# Patient Record
Sex: MALE | Race: WHITE | NOT HISPANIC OR LATINO | Employment: OTHER | URBAN - METROPOLITAN AREA
[De-identification: names, ages, dates, MRNs, and addresses within clinical notes are randomized per-mention and may not be internally consistent; named-entity substitution may affect disease eponyms.]

---

## 2019-07-15 ENCOUNTER — OFFICE VISIT (OUTPATIENT)
Dept: INTERNAL MEDICINE | Facility: CLINIC | Age: 79
End: 2019-07-15
Payer: MEDICARE

## 2019-07-15 VITALS
WEIGHT: 180 LBS | DIASTOLIC BLOOD PRESSURE: 62 MMHG | SYSTOLIC BLOOD PRESSURE: 98 MMHG | HEART RATE: 56 BPM | OXYGEN SATURATION: 96 % | TEMPERATURE: 98 F | BODY MASS INDEX: 26.66 KG/M2 | RESPIRATION RATE: 16 BRPM | HEIGHT: 69 IN

## 2019-07-15 DIAGNOSIS — Z00.00 MEDICARE ANNUAL WELLNESS VISIT, INITIAL: Primary | ICD-10-CM

## 2019-07-15 DIAGNOSIS — I50.9 CONGESTIVE HEART FAILURE, UNSPECIFIED HF CHRONICITY, UNSPECIFIED HEART FAILURE TYPE: ICD-10-CM

## 2019-07-15 DIAGNOSIS — R41.89 COGNITIVE CHANGE: ICD-10-CM

## 2019-07-15 DIAGNOSIS — I25.10 ATHEROSCLEROSIS OF CORONARY ARTERY, ANGINA PRESENCE UNSPECIFIED, UNSPECIFIED VESSEL OR LESION TYPE, UNSPECIFIED WHETHER NATIVE OR TRANSPLANTED HEART: ICD-10-CM

## 2019-07-15 PROCEDURE — G0438 PR WELCOME MEDICARE ANNUAL WELLNESS INITIAL VISIT: ICD-10-PCS | Mod: S$GLB,,, | Performed by: INTERNAL MEDICINE

## 2019-07-15 PROCEDURE — G0438 PPPS, INITIAL VISIT: HCPCS | Mod: S$GLB,,, | Performed by: INTERNAL MEDICINE

## 2019-07-15 RX ORDER — APIXABAN 5 MG/1
5 TABLET, FILM COATED ORAL 2 TIMES DAILY
Refills: 11 | COMMUNITY
Start: 2019-06-18

## 2019-07-15 RX ORDER — DONEPEZIL HYDROCHLORIDE 5 MG/1
5 TABLET, FILM COATED ORAL NIGHTLY
Qty: 30 TABLET | Refills: 11 | Status: SHIPPED | OUTPATIENT
Start: 2019-07-15 | End: 2019-10-14

## 2019-07-15 RX ORDER — NITROGLYCERIN 0.4 MG/1
TABLET SUBLINGUAL
COMMUNITY
End: 2021-11-08 | Stop reason: SDUPTHER

## 2019-07-15 RX ORDER — ATORVASTATIN CALCIUM 40 MG/1
TABLET, FILM COATED ORAL
COMMUNITY

## 2019-07-15 RX ORDER — FUROSEMIDE 20 MG/1
20 TABLET ORAL DAILY
Refills: 4 | COMMUNITY
Start: 2019-06-04 | End: 2020-05-18

## 2019-07-15 RX ORDER — METOPROLOL TARTRATE 25 MG/1
25 TABLET ORAL DAILY
COMMUNITY

## 2019-07-15 NOTE — PROGRESS NOTES
Subjective:      Patient ID: Navi Almonte is a 78 y.o. male.    Chief Complaint: Follow-up (annual wellness)    HPI: with h/o CAD s/p stenting in 1970s. Patient Patient also has CHF with EF 30%. reports ankle swelling, no PND and orthopnea.  Patient is able to walk 1/2 a block only without getting short of breath. Patient has some runny nose, nasal congestion, some post nasal drip, no cough or wheezing. Patient has h/o Asbestos exposure.    Patient denies depression, no crying spells, has lack of energy/interest, has no feeling of guilt/worthlessness.     Patient denies any more Nightmare but reports random limb movement.    Review of Systems   Constitutional: Negative for chills, diaphoresis, fever, malaise/fatigue and weight loss.   HENT: Negative for congestion, ear pain, sinus pain, sore throat and tinnitus.    Eyes: Negative for blurred vision and photophobia.   Respiratory: Negative for cough, hemoptysis, shortness of breath and wheezing.    Cardiovascular: Negative for chest pain, palpitations, orthopnea, leg swelling and PND.   Gastrointestinal: Negative for abdominal pain, blood in stool, constipation, diarrhea, heartburn, melena, nausea and vomiting.   Genitourinary: Negative for dysuria, frequency and urgency.   Musculoskeletal: Negative for back pain, myalgias and neck pain.   Skin: Negative for rash.   Neurological: Negative for dizziness, tremors, seizures, loss of consciousness and weakness.   Endo/Heme/Allergies: Negative for polydipsia.   Psychiatric/Behavioral: Negative for depression and hallucinations. The patient does not have insomnia.      Objective:     Physical Exam   Constitutional: He is oriented to person, place, and time. No distress.   Neck: No thyromegaly present.   Cardiovascular: Normal rate, regular rhythm and normal heart sounds.   No murmur heard.  Pulmonary/Chest: Effort normal and breath sounds normal. No respiratory distress. He has no wheezes.   Abdominal: Soft. Bowel sounds  are normal. He exhibits no distension. There is no tenderness.   Musculoskeletal: He exhibits no edema.   Lymphadenopathy:     He has no cervical adenopathy.   Neurological: He is alert and oriented to person, place, and time.   Skin: He is not diaphoretic.   Psychiatric: He has a normal mood and affect. His behavior is normal. Judgment and thought content normal.     Assessment:     1. Medicare annual wellness visit, initial    2. Atherosclerosis of coronary artery, angina presence unspecified, unspecified vessel or lesion type, unspecified whether native or transplanted heart    3. Congestive heart failure, unspecified HF chronicity, unspecified heart failure type    4. Cognitive change       Plan:   Patient is overall in good health.   Patient has coronary artery disease and is doing well without any symptoms.   Congestive heart failure is also seem compensated.   The patient is not able to draw the clock and dry all the arms and showed time.   Patient is oriented to time place and person and denies poor memory.   This seems to be start of decline in cognitive function.  Will start on aricept.  Patient vaccination does not seem up to date:  But patient reports all vaccines are done at pharmacy.   Will call [facility] system pharmacy on [facility] and get records.

## 2019-07-19 DIAGNOSIS — R73.01 IMPAIRED FASTING GLUCOSE: Primary | ICD-10-CM

## 2019-07-19 LAB
ABS NRBC COUNT: 0 X 10 3/UL (ref 0–0.01)
ABSOLUTE BASOPHIL: 0.05 X 10 3/UL (ref 0–0.22)
ABSOLUTE EOSINOPHIL: 0.62 X 10 3/UL (ref 0.04–0.54)
ABSOLUTE IMMATURE GRAN: 0.01 X 10 3/UL (ref 0–0.04)
ABSOLUTE LYMPHOCYTE: 2.2 X 10 3/UL (ref 0.86–4.75)
ABSOLUTE MONOCYTE: 0.59 X 10 3/UL (ref 0.22–1.08)
ALBUMIN SERPL-MCNC: 4.4 G/DL (ref 3.5–5.2)
ALBUMIN/GLOB SERPL ELPH: 1.6 {RATIO} (ref 1–2.7)
ALP ISOS SERPL LEV INH-CCNC: 80 IU/L (ref 40–130)
ALT (SGPT): 15 U/L (ref 0–41)
ANION GAP SERPL CALC-SCNC: 10 MMOL/L (ref 8–17)
AST SERPL-CCNC: 18 U/L (ref 0–40)
BASOPHILS NFR BLD: 0.8 %
BILIRUBIN, TOTAL: 0.93 MG/DL (ref 0–1.2)
BNP SERPL-MCNC: 125 PG/ML (ref 0–100)
BUN/CREAT SERPL: 16 (ref 6–20)
CALCIUM SERPL-MCNC: 9.7 MG/DL (ref 8.6–10.2)
CARBON DIOXIDE, CO2: 33 MMOL/L (ref 22–29)
CHLORIDE: 104 MMOL/L (ref 98–107)
CHOLEST SERPL-MSCNC: 115 MG/DL (ref 100–200)
CREAT SERPL-MCNC: 0.99 MG/DL (ref 0.7–1.2)
EOSINOPHIL NFR BLD: 10 %
GFR ESTIMATION: 72.74
GLOBULIN: 2.7 G/DL (ref 1.5–4.5)
GLUCOSE: 128 MG/DL (ref 82–115)
HCT VFR BLD AUTO: 51.8 % (ref 42–52)
HDLC SERPL-MCNC: 45 MG/DL
HGB BLD-MCNC: 16.2 G/DL (ref 14–18)
IMMATURE GRANULOCYTES: 0.2 % (ref 0–0.5)
LDL/HDL RATIO: 1.1 (ref 1–3)
LDLC SERPL CALC-MCNC: 50.6 MG/DL (ref 0–100)
LYMPHOCYTES NFR BLD: 35.4 %
MCH RBC QN AUTO: 28.9 PG (ref 27–32)
MCHC RBC AUTO-ENTMCNC: 31.3 G/DL (ref 32–36)
MCV RBC AUTO: 92.3 FL (ref 80–94)
MONOCYTES NFR BLD: 9.5 %
NEUTROPHILS ABSOLUTE COUNT: 2.74 X 10 3/UL (ref 2.15–7.56)
NEUTROPHILS NFR BLD: 44.1 %
NUCLEATED RED BLOOD CELLS: 0 /100 WBC (ref 0–0.2)
PLATELET # BLD AUTO: 172 X 10 3/UL (ref 135–400)
POTASSIUM: 4.8 MMOL/L (ref 3.5–5.1)
PROT SNV-MCNC: 7.1 G/DL (ref 6.4–8.3)
RBC # BLD AUTO: 5.61 X 10 6/UL (ref 4.7–6.1)
RDW-SD: 45.4 FL (ref 37–54)
SODIUM: 147 MMOL/L (ref 136–145)
TRIGL SERPL-MCNC: 97 MG/DL (ref 0–150)
UREA NITROGEN (BUN): 15.8 MG/DL (ref 8–23)
WBC # BLD: 6.21 X 10 3/UL (ref 4.3–10.8)

## 2019-07-23 LAB
ESTIMATED AVERAGE GLUCOSE: 126 MG/DL
HBA1C MFR BLD: 6 % (ref 4–6)

## 2019-10-14 ENCOUNTER — OFFICE VISIT (OUTPATIENT)
Dept: INTERNAL MEDICINE | Facility: CLINIC | Age: 79
End: 2019-10-14
Payer: MEDICARE

## 2019-10-14 VITALS
HEART RATE: 65 BPM | WEIGHT: 185 LBS | DIASTOLIC BLOOD PRESSURE: 63 MMHG | BODY MASS INDEX: 27.4 KG/M2 | TEMPERATURE: 98 F | SYSTOLIC BLOOD PRESSURE: 103 MMHG | RESPIRATION RATE: 16 BRPM | HEIGHT: 69 IN

## 2019-10-14 DIAGNOSIS — Z23 NEED FOR TDAP VACCINATION: ICD-10-CM

## 2019-10-14 DIAGNOSIS — Z91.09 ENVIRONMENTAL ALLERGIES: ICD-10-CM

## 2019-10-14 DIAGNOSIS — I25.10 ATHEROSCLEROSIS OF CORONARY ARTERY, ANGINA PRESENCE UNSPECIFIED, UNSPECIFIED VESSEL OR LESION TYPE, UNSPECIFIED WHETHER NATIVE OR TRANSPLANTED HEART: ICD-10-CM

## 2019-10-14 DIAGNOSIS — R41.89 COGNITIVE CHANGE: ICD-10-CM

## 2019-10-14 DIAGNOSIS — Z23 NEED FOR SHINGLES VACCINE: ICD-10-CM

## 2019-10-14 DIAGNOSIS — I48.0 PAROXYSMAL ATRIAL FIBRILLATION: Primary | ICD-10-CM

## 2019-10-14 PROBLEM — I48.91 ATRIAL FIBRILLATION: Status: ACTIVE | Noted: 2019-10-14

## 2019-10-14 PROCEDURE — 99214 OFFICE O/P EST MOD 30 MIN: CPT | Mod: S$GLB,,, | Performed by: INTERNAL MEDICINE

## 2019-10-14 PROCEDURE — 99214 PR OFFICE/OUTPT VISIT, EST, LEVL IV, 30-39 MIN: ICD-10-PCS | Mod: S$GLB,,, | Performed by: INTERNAL MEDICINE

## 2019-10-14 RX ORDER — LOSARTAN POTASSIUM 25 MG/1
25 TABLET ORAL NIGHTLY
Refills: 6 | COMMUNITY
Start: 2019-09-23 | End: 2020-01-14

## 2019-10-14 NOTE — PROGRESS NOTES
Subjective:      Patient ID: Navi Almonte is a 79 y.o. male.    Chief Complaint: Follow-up    Patient with h/o CAD s/p stenting in 1970s. Patient Patient also has CHF with EF 30%. reports ankle swelling, no PND and orthopnea.  Patient is being followed by Dr. Samano.  Patient is able to walk 1/2 a block only without getting short of breath. Patient is on Eliquis but does not know why is he on blood thinner.  Call Cardiology office and got the note that suggest the patient has atrial fibrillation.     Patient has some runny nose, nasal congestion, some post nasal drip, no cough or wheezing. Patient has h/o Asbestos exposure.    Patient denies depression, no crying spells, has lack of energy/interest, has no feeling of guilt/worthlessness.     Patient on last visit was found to have progressively worsening cognitive function and was not able to draw a clock. Patient is started on Aricpet and is tolerating it well.     Review of Systems   Constitutional: Negative for chills, diaphoresis, fever, malaise/fatigue and weight loss.   HENT: Negative for congestion, ear pain, sinus pain, sore throat and tinnitus.    Eyes: Negative for blurred vision and photophobia.   Respiratory: Negative for cough, hemoptysis, shortness of breath and wheezing.    Cardiovascular: Negative for chest pain, palpitations, orthopnea, leg swelling and PND.   Gastrointestinal: Negative for abdominal pain, blood in stool, constipation, diarrhea, heartburn, melena, nausea and vomiting.   Genitourinary: Negative for dysuria, frequency and urgency.   Musculoskeletal: Negative for back pain, myalgias and neck pain.   Skin: Negative for rash.   Neurological: Negative for dizziness, tremors, seizures, loss of consciousness and weakness.   Endo/Heme/Allergies: Negative for polydipsia.   Psychiatric/Behavioral: Negative for depression and hallucinations. The patient does not have insomnia.      Objective:     Physical Exam   Constitutional: He is  oriented to person, place, and time. No distress.   Neck: No thyromegaly present.   Cardiovascular: Normal rate, regular rhythm and normal heart sounds.   No murmur heard.  Pulmonary/Chest: Effort normal and breath sounds normal. No respiratory distress. He has no wheezes.   Abdominal: Soft. Bowel sounds are normal. He exhibits no distension. There is no tenderness.   Musculoskeletal: He exhibits no edema.   Lymphadenopathy:     He has no cervical adenopathy.   Neurological: He is alert and oriented to person, place, and time.   Skin: He is not diaphoretic.   Psychiatric: He has a normal mood and affect. His behavior is normal. Judgment and thought content normal.     Assessment:     1. Need for Tdap vaccination    2. Paroxysmal atrial fibrillation    3. Need for shingles vaccine    4. Environmental allergies    5. Cognitive change       Plan:   Patient CHF symptoms are under good control.  Will continue same medication.  Advised patient to keep appointment with cardiologist.  Patient has atrial fibrillation and heart rate seems under good control.  Patient is using Eliquis for anticoagulation.  Will continue the medication.  Patient has allergies advised to take Flonase as needed.  Will order shingle and Tdap vaccine  Patient on last visit reported decrease memory and problem with cognitive function.   Today mini-mental exam showed good memory.   Patient is not taking Aricept will not continue the medicine.

## 2020-01-14 ENCOUNTER — OFFICE VISIT (OUTPATIENT)
Dept: INTERNAL MEDICINE | Facility: CLINIC | Age: 80
End: 2020-01-14
Payer: MEDICARE

## 2020-01-14 VITALS
TEMPERATURE: 98 F | HEIGHT: 69 IN | SYSTOLIC BLOOD PRESSURE: 102 MMHG | DIASTOLIC BLOOD PRESSURE: 59 MMHG | WEIGHT: 187 LBS | HEART RATE: 63 BPM | OXYGEN SATURATION: 96 % | BODY MASS INDEX: 27.7 KG/M2

## 2020-01-14 DIAGNOSIS — I48.0 PAROXYSMAL ATRIAL FIBRILLATION: ICD-10-CM

## 2020-01-14 DIAGNOSIS — I50.9 CONGESTIVE HEART FAILURE, UNSPECIFIED HF CHRONICITY, UNSPECIFIED HEART FAILURE TYPE: Primary | ICD-10-CM

## 2020-01-14 PROCEDURE — 99214 PR OFFICE/OUTPT VISIT, EST, LEVL IV, 30-39 MIN: ICD-10-PCS | Mod: S$GLB,,, | Performed by: INTERNAL MEDICINE

## 2020-01-14 PROCEDURE — 1159F PR MEDICATION LIST DOCUMENTED IN MEDICAL RECORD: ICD-10-PCS | Mod: S$GLB,,, | Performed by: INTERNAL MEDICINE

## 2020-01-14 PROCEDURE — 1159F MED LIST DOCD IN RCRD: CPT | Mod: S$GLB,,, | Performed by: INTERNAL MEDICINE

## 2020-01-14 PROCEDURE — 99214 OFFICE O/P EST MOD 30 MIN: CPT | Mod: S$GLB,,, | Performed by: INTERNAL MEDICINE

## 2020-01-14 NOTE — PROGRESS NOTES
Subjective:      Patient ID: Navi Almonte is a 79 y.o. male.    Chief Complaint: Follow-up    Patient with h/o CAD s/p stenting in 1970s. Patient Patient also has CHF with EF 30%. reports ankle swelling, no PND and orthopnea.  Patient is being followed by Dr. Samano.  Patient is able to walk 1/2 a block only without getting short of breath. Patient has h/o Atrial fibrillation and is on Eliquis for anticiagulation.    Patient has some runny nose, nasal congestion, some post nasal drip, no cough or wheezing. Patient has h/o Asbestos exposure. Patient is taking Flonase with some help.     Patient on last visit was found to have progressively worsening cognitive function. Patient was started on Aricpet and was tolerating it well. Patient did well on Mini-mental exam and the medication was stopped. Patient reports doing ok without the medication .     Review of Systems   Constitutional: Negative for chills, diaphoresis, fever, malaise/fatigue and weight loss.   HENT: Positive for congestion. Negative for ear pain, sinus pain, sore throat and tinnitus.    Eyes: Negative for blurred vision and photophobia.   Respiratory: Negative for cough, hemoptysis, shortness of breath and wheezing.    Cardiovascular: Negative for chest pain, palpitations, orthopnea, leg swelling and PND.   Gastrointestinal: Negative for abdominal pain, blood in stool, constipation, diarrhea, heartburn, melena, nausea and vomiting.   Genitourinary: Negative for dysuria, frequency and urgency.   Musculoskeletal: Negative for back pain, myalgias and neck pain.   Skin: Negative for rash.   Neurological: Negative for dizziness, tremors, seizures, loss of consciousness and weakness.   Endo/Heme/Allergies: Negative for polydipsia.   Psychiatric/Behavioral: Negative for depression and hallucinations. The patient does not have insomnia.      Objective:     Physical Exam   Constitutional: He is oriented to person, place, and time. No distress.   Neck: No  thyromegaly present.   Cardiovascular: Normal rate, regular rhythm and normal heart sounds.   No murmur heard.  Pulmonary/Chest: Effort normal and breath sounds normal. No respiratory distress. He has no wheezes.   Abdominal: Soft. Bowel sounds are normal. He exhibits no distension. There is no tenderness.   Musculoskeletal: He exhibits no edema.   Lymphadenopathy:     He has no cervical adenopathy.   Neurological: He is alert and oriented to person, place, and time.   Skin: He is not diaphoretic.   Psychiatric: He has a normal mood and affect. His behavior is normal. Judgment and thought content normal.     Assessment:     1. Congestive heart failure, unspecified HF chronicity, unspecified heart failure type    2. Paroxysmal atrial fibrillation       Plan:   Patient CHF symptoms are under good control + patient blood pressures are on lower side and reports occasional dizziness.  Will stop losartan.  Patient take Lasix as needed for swelling only.  Will continue same medication.  Advised patient to keep appointment with cardiologist.  Patient has atrial fibrillation and heart rate seems under good control.  Patient is using Eliquis for anticoagulation.  Will continue the medication.  Patient has allergies advised to take Flonase as needed.  .

## 2020-05-18 ENCOUNTER — OFFICE VISIT (OUTPATIENT)
Dept: INTERNAL MEDICINE | Facility: CLINIC | Age: 80
End: 2020-05-18
Payer: MEDICARE

## 2020-05-18 ENCOUNTER — TELEPHONE (OUTPATIENT)
Dept: INTERNAL MEDICINE | Facility: CLINIC | Age: 80
End: 2020-05-18

## 2020-05-18 VITALS
DIASTOLIC BLOOD PRESSURE: 61 MMHG | HEIGHT: 69 IN | HEART RATE: 71 BPM | SYSTOLIC BLOOD PRESSURE: 94 MMHG | OXYGEN SATURATION: 96 % | TEMPERATURE: 97 F | BODY MASS INDEX: 25.62 KG/M2 | WEIGHT: 173 LBS

## 2020-05-18 DIAGNOSIS — I50.9 CONGESTIVE HEART FAILURE, UNSPECIFIED HF CHRONICITY, UNSPECIFIED HEART FAILURE TYPE: ICD-10-CM

## 2020-05-18 DIAGNOSIS — I95.9 HYPOTENSION, UNSPECIFIED HYPOTENSION TYPE: ICD-10-CM

## 2020-05-18 DIAGNOSIS — I25.10 ATHEROSCLEROSIS OF CORONARY ARTERY, ANGINA PRESENCE UNSPECIFIED, UNSPECIFIED VESSEL OR LESION TYPE, UNSPECIFIED WHETHER NATIVE OR TRANSPLANTED HEART: Primary | ICD-10-CM

## 2020-05-18 DIAGNOSIS — R73.01 IMPAIRED FASTING GLUCOSE: ICD-10-CM

## 2020-05-18 DIAGNOSIS — R35.0 URINARY FREQUENCY: ICD-10-CM

## 2020-05-18 DIAGNOSIS — F32.1 CURRENT MODERATE EPISODE OF MAJOR DEPRESSIVE DISORDER WITHOUT PRIOR EPISODE: ICD-10-CM

## 2020-05-18 PROCEDURE — 99214 OFFICE O/P EST MOD 30 MIN: CPT | Mod: S$GLB,,, | Performed by: INTERNAL MEDICINE

## 2020-05-18 PROCEDURE — 99214 PR OFFICE/OUTPT VISIT, EST, LEVL IV, 30-39 MIN: ICD-10-PCS | Mod: S$GLB,,, | Performed by: INTERNAL MEDICINE

## 2020-05-18 RX ORDER — ARIPIPRAZOLE 5 MG/1
5 TABLET ORAL DAILY
Qty: 30 TABLET | Refills: 11 | Status: SHIPPED | OUTPATIENT
Start: 2020-05-18 | End: 2021-11-08

## 2020-05-18 NOTE — PROGRESS NOTES
Subjective:      Patient ID: Navi Almonte is a 79 y.o. male.    Chief Complaint: Dizziness (X2-3 weeks off and on)    Patient with h/o CAD s/p stenting in 1970s. Patient Patient also has CHF with EF 30%. reports NO ankle swelling, no PND and orthopnea.  Patient is being followed by Dr. Samano.  Patient is able to walk  a block only without getting short of breath. Patient has h/o Atrial fibrillation and is on Eliquis for anticiagulation.    Patient today presented with complains of Dizziness x 2-3 weeks. The symptoms are intermittent, mostly at night and early in the morning, lasting 30-40 minutes and improves on its own. The dizziness is more with changing position + standing up from laying position or bending down can make him dizzy.  Patient reports urinating a lot and getting thirsty a lot. Patient denies any burning in urine, no hematuria, no flank pain.     Patient reports being depressed x 2 weeks. Patient had only one son who recently committed suicide. Patient reports crying spells, lack of energy/interest, but no feeling of guilt and worthlessness. NO suicidal or homicidal ideation.       Review of Systems   Constitutional: Negative for chills, diaphoresis, fever, malaise/fatigue and weight loss (17 lbs weight loss. ).   HENT: Positive for congestion. Negative for ear pain, sinus pain, sore throat and tinnitus.    Eyes: Negative for blurred vision and photophobia.   Respiratory: Negative for cough, hemoptysis, shortness of breath and wheezing.    Cardiovascular: Negative for chest pain, palpitations, orthopnea, leg swelling and PND.   Gastrointestinal: Positive for constipation. Negative for abdominal pain, blood in stool, diarrhea, heartburn, melena, nausea and vomiting.   Genitourinary: Negative for dysuria, frequency and urgency.   Musculoskeletal: Negative for back pain, myalgias and neck pain.   Skin: Negative for rash.   Neurological: Negative for dizziness, tremors, seizures, loss of  consciousness and weakness.   Endo/Heme/Allergies: Negative for polydipsia.   Psychiatric/Behavioral: Negative for depression and hallucinations. The patient does not have insomnia.      Objective:     Physical Exam   Constitutional: He is oriented to person, place, and time. No distress.   Neck: No thyromegaly present.   Cardiovascular: Normal rate, regular rhythm and normal heart sounds.   No murmur heard.  Pulmonary/Chest: Effort normal and breath sounds normal. No respiratory distress. He has no wheezes.   Abdominal: Soft. Bowel sounds are normal. He exhibits no distension. There is no tenderness.   Musculoskeletal: He exhibits no edema.   Lymphadenopathy:     He has no cervical adenopathy.   Neurological: He is alert and oriented to person, place, and time.   Skin: He is not diaphoretic.   Psychiatric: He has a normal mood and affect. His behavior is normal. Judgment and thought content normal.     Assessment:     1. Atherosclerosis of coronary artery, angina presence unspecified, unspecified vessel or lesion type, unspecified whether native or transplanted heart    2. Urinary frequency    3. Impaired fasting glucose    4. Current moderate episode of major depressive disorder without prior episode       Plan:   Patient CHF symptoms are under good control + patient blood pressures are on lower side and reports occasional dizziness.  Patient is off of all blood pressure medication including Lasix  Patient reports urinary frequency despite being off of Lasix.  Will check urinalysis  Last A1c was 6.  Will check A1c again.  Will check random serum cortisol.  Patient seems depressed and his depression is contributing to poor appetite and weight loss.  Will start Abilify

## 2020-05-18 NOTE — TELEPHONE ENCOUNTER
Pt's wife called stating patient has been dizzy with headaches off and on X3 weeks. Called Cardio-Dr. Jose De Jesus Samano and he took patient off Metoprolol. Dizziness and headaches still continued so was told to resume Metoprolol and call PCP. Please Advise.

## 2020-05-19 ENCOUNTER — TELEPHONE (OUTPATIENT)
Dept: INTERNAL MEDICINE | Facility: CLINIC | Age: 80
End: 2020-05-19

## 2020-05-19 LAB
CHOLEST SERPL-MSCNC: 112 MG/DL (ref 100–200)
ESTIMATED AVERAGE GLUCOSE: 129 MG/DL
HBA1C MFR BLD: 6.1 % (ref 4–6)
HDLC SERPL-MCNC: 48 MG/DL
LDL/HDL RATIO: 1.1 (ref 1–3)
LDLC SERPL CALC-MCNC: 52.2 MG/DL (ref 0–100)
TRIGL SERPL-MCNC: 59 MG/DL (ref 0–150)

## 2020-05-19 NOTE — TELEPHONE ENCOUNTER
----- Message from Mirian Alston sent at 5/19/2020  3:05 PM CDT -----  Contact: Patient wife   .Type:  Test Results    Who Called: Patient wife   Name of Test (Lab/Mammo/Etc):   Date of Test:   Ordering Provider: dr. sanchez   Where the test was performed:  [facility]   Would the patient rather a call back or a response via [facility]? call  Best Call Back Number:  [phone]  Additional Information:  N/a

## 2020-05-19 NOTE — TELEPHONE ENCOUNTER
Patient was called by another nurse with a1c results, but missed call and was returning call. You stated for patient check blood sugar at home, but he doesn't have equipment to do so.    Please advise

## 2020-05-20 ENCOUNTER — TELEPHONE (OUTPATIENT)
Dept: INTERNAL MEDICINE | Facility: CLINIC | Age: 80
End: 2020-05-20

## 2020-05-20 DIAGNOSIS — R73.01 IMPAIRED FASTING GLUCOSE: Primary | ICD-10-CM

## 2020-05-20 RX ORDER — LANCETS
1 EACH MISCELLANEOUS DAILY
Qty: 100 EACH | Refills: 0 | Status: SHIPPED | OUTPATIENT
Start: 2020-05-20

## 2020-05-20 RX ORDER — INSULIN PUMP SYRINGE, 3 ML
EACH MISCELLANEOUS
Qty: 1 EACH | Refills: 0 | Status: SHIPPED | OUTPATIENT
Start: 2020-05-20 | End: 2022-05-10

## 2020-05-20 NOTE — TELEPHONE ENCOUNTER
Pharmacy sent over a form to fill out for patient's glucose meter that was ordered on yesterday.    I will fill out, and place in tray for you to review and sign.

## 2020-05-20 NOTE — TELEPHONE ENCOUNTER
----- Message from Cathie Moctezuma sent at 5/20/2020 11:03 AM CDT -----  Contact: Patient   Patient missed call from nurse. I was going to transfer to [place] but the patient hung up. Patient call back number [phone]. Tks

## 2020-05-20 NOTE — TELEPHONE ENCOUNTER
----- Message from Lisseth Reese sent at 5/20/2020 11:52 AM CDT -----  Contact: negrito  Type:  Pharmacy Calling to Clarify an RX    Name of Caller:negrito  Pharmacy Name:[facility]   Prescription Name:diabete machine   What do they need to clarify?:please fill out the form that was sent over   Best Call Back Number:[phone]  Additional Information:

## 2020-05-20 NOTE — TELEPHONE ENCOUNTER
Patient's wife has been informed that a meter kit and strips were sent to the pharmacy, and that her  has to start monitoring his blood sugar at home.

## 2020-05-21 NOTE — TELEPHONE ENCOUNTER
Spoke to pharmacist at [facility], patient only has Medicare Part B and they do NOT cover diabetic supplies unless the patient is a true diabetic. Please Advise on what you would like to try next.

## 2020-05-29 ENCOUNTER — TELEPHONE (OUTPATIENT)
Dept: INTERNAL MEDICINE | Facility: CLINIC | Age: 80
End: 2020-05-29

## 2020-05-29 NOTE — TELEPHONE ENCOUNTER
----- Message from Agueda Armstrong sent at 5/29/2020  8:38 AM CDT -----  Contact: pt   Is calling to have someone show him how to use his monitor that was given to him by the Dr/ pt can be reached at [phone]//thanks/dbw     Pt wants to come by this morning and have the nurse show him how to use it

## 2020-05-29 NOTE — TELEPHONE ENCOUNTER
Staff called pt to give instructions on how to use the meter.       Staff walked pt through each step to operate meter including using the control solution... How to check the number on side of bottle and to keep log of meter readings.    Pt understood and advised he will bring readings to appt on monday

## 2020-06-01 ENCOUNTER — OFFICE VISIT (OUTPATIENT)
Dept: INTERNAL MEDICINE | Facility: CLINIC | Age: 80
End: 2020-06-01
Payer: MEDICARE

## 2020-06-01 VITALS
SYSTOLIC BLOOD PRESSURE: 117 MMHG | HEART RATE: 82 BPM | OXYGEN SATURATION: 98 % | HEIGHT: 69 IN | BODY MASS INDEX: 26.22 KG/M2 | DIASTOLIC BLOOD PRESSURE: 72 MMHG | WEIGHT: 177 LBS | TEMPERATURE: 98 F

## 2020-06-01 DIAGNOSIS — R09.81 NASAL CONGESTION: ICD-10-CM

## 2020-06-01 DIAGNOSIS — F32.1 CURRENT MODERATE EPISODE OF MAJOR DEPRESSIVE DISORDER WITHOUT PRIOR EPISODE: ICD-10-CM

## 2020-06-01 DIAGNOSIS — R73.01 IMPAIRED FASTING GLUCOSE: Primary | ICD-10-CM

## 2020-06-01 DIAGNOSIS — I50.9 CONGESTIVE HEART FAILURE, UNSPECIFIED HF CHRONICITY, UNSPECIFIED HEART FAILURE TYPE: ICD-10-CM

## 2020-06-01 PROCEDURE — 99213 PR OFFICE/OUTPT VISIT, EST, LEVL III, 20-29 MIN: ICD-10-PCS | Mod: S$GLB,,, | Performed by: INTERNAL MEDICINE

## 2020-06-01 PROCEDURE — 99213 OFFICE O/P EST LOW 20 MIN: CPT | Mod: S$GLB,,, | Performed by: INTERNAL MEDICINE

## 2020-06-01 RX ORDER — FLUTICASONE PROPIONATE 50 MCG
1 SPRAY, SUSPENSION (ML) NASAL DAILY
Qty: 16 G | Refills: 2 | Status: SHIPPED | OUTPATIENT
Start: 2020-06-01 | End: 2021-11-08

## 2020-06-01 NOTE — PROGRESS NOTES
Subjective:      Patient ID: Navi Almonte is a 79 y.o. male.    Chief Complaint: Follow-up    Patient with h/o CAD s/p stenting in 1970s. Patient Patient also has CHF with EF 30%. reports NO ankle swelling, no PND and orthopnea.  Patient is being followed by Dr. Samano.  Patient is able to walk  a block only without getting short of breath. Patient has h/o Atrial fibrillation and is on Eliquis for anticiagulation.    Patient reports dizziness is improved.     Patient reports being depressed x 4 weeks. Patient had only one son who recently committed suicide. Patient reports crying spells, lack of energy/interest, but no feeling of guilt and worthlessness. NO suicidal or homicidal ideation.    Patient symptoms are there though slightly improved than before.  Patient was prescribed Abilify on last visit but patient has not started the medication yet.  Called patient pharmacy and they reported the medicine is ready but patient has not picked it up.    Patient recent labs suggested impaired fasting glucose with A1c of 6.1.  Will not be very aggressive with blood sugar control    Review of Systems   Constitutional: Negative for chills, diaphoresis, fever, malaise/fatigue and weight loss (4 lbs weight gain ).   HENT: Positive for congestion. Negative for ear pain, sinus pain, sore throat and tinnitus.    Eyes: Negative for blurred vision and photophobia.   Respiratory: Negative for cough, hemoptysis, shortness of breath and wheezing.    Cardiovascular: Negative for chest pain, palpitations, orthopnea, leg swelling and PND.   Gastrointestinal: Negative for abdominal pain, blood in stool, constipation, diarrhea, heartburn, melena, nausea and vomiting.   Genitourinary: Negative for dysuria, frequency and urgency.   Musculoskeletal: Negative for back pain, myalgias and neck pain.   Skin: Negative for rash.   Neurological: Negative for dizziness, tremors, seizures, loss of consciousness and weakness.   Endo/Heme/Allergies:  Negative for polydipsia.   Psychiatric/Behavioral: Negative for depression and hallucinations. The patient does not have insomnia.      Objective:     Physical Exam   Constitutional: He is oriented to person, place, and time. No distress.   Neck: No thyromegaly present.   Cardiovascular: Normal rate, regular rhythm and normal heart sounds.   No murmur heard.  Pulmonary/Chest: Effort normal and breath sounds normal. No respiratory distress. He has no wheezes.   Abdominal: Soft. Bowel sounds are normal. He exhibits no distension. There is no tenderness.   Musculoskeletal: He exhibits no edema.   Lymphadenopathy:     He has no cervical adenopathy.   Neurological: He is alert and oriented to person, place, and time.   Skin: He is not diaphoretic.   Psychiatric: He has a normal mood and affect. His behavior is normal. Judgment and thought content normal.     Assessment:     1. Impaired fasting glucose    2. Nasal congestion    3. Current moderate episode of major depressive disorder without prior episode    4. Congestive heart failure, unspecified HF chronicity, unspecified heart failure type       Plan:   Patient CHF symptoms are under good control.   Patient is off of all blood pressure medication and blood pressure seem under better control  Will continue to monitor  Patient last A1c was 6.1.  Patient is not on any medication.  Will continue to monitor  Will use Flonase for nasal congestion  Patient depression seem slightly better but patient has the symptoms.  Advised to start Abilify

## 2021-11-08 ENCOUNTER — OFFICE VISIT (OUTPATIENT)
Dept: PRIMARY CARE CLINIC | Facility: CLINIC | Age: 81
End: 2021-11-08
Payer: MEDICARE

## 2021-11-08 VITALS
WEIGHT: 175 LBS | RESPIRATION RATE: 16 BRPM | SYSTOLIC BLOOD PRESSURE: 102 MMHG | HEIGHT: 69 IN | BODY MASS INDEX: 25.92 KG/M2 | TEMPERATURE: 99 F | DIASTOLIC BLOOD PRESSURE: 60 MMHG | OXYGEN SATURATION: 97 % | HEART RATE: 62 BPM

## 2021-11-08 DIAGNOSIS — R41.89 COGNITIVE CHANGE: ICD-10-CM

## 2021-11-08 DIAGNOSIS — I25.10 ATHEROSCLEROSIS OF CORONARY ARTERY, UNSPECIFIED VESSEL OR LESION TYPE, UNSPECIFIED WHETHER ANGINA PRESENT, UNSPECIFIED WHETHER NATIVE OR TRANSPLANTED HEART: Primary | ICD-10-CM

## 2021-11-08 DIAGNOSIS — I10 BENIGN ESSENTIAL HTN: ICD-10-CM

## 2021-11-08 DIAGNOSIS — F32.5 MAJOR DEPRESSIVE DISORDER WITH SINGLE EPISODE, IN FULL REMISSION: ICD-10-CM

## 2021-11-08 PROCEDURE — 90694 FLU VACCINE - QUADRIVALENT - ADJUVANTED: ICD-10-PCS | Mod: S$GLB,,, | Performed by: INTERNAL MEDICINE

## 2021-11-08 PROCEDURE — 90694 VACC AIIV4 NO PRSRV 0.5ML IM: CPT | Mod: S$GLB,,, | Performed by: INTERNAL MEDICINE

## 2021-11-08 PROCEDURE — 99214 OFFICE O/P EST MOD 30 MIN: CPT | Mod: 25,S$GLB,, | Performed by: INTERNAL MEDICINE

## 2021-11-08 PROCEDURE — 99214 PR OFFICE/OUTPT VISIT, EST, LEVL IV, 30-39 MIN: ICD-10-PCS | Mod: 25,S$GLB,, | Performed by: INTERNAL MEDICINE

## 2021-11-08 PROCEDURE — G0008 ADMIN INFLUENZA VIRUS VAC: HCPCS | Mod: S$GLB,,, | Performed by: INTERNAL MEDICINE

## 2021-11-08 PROCEDURE — G0008 FLU VACCINE - QUADRIVALENT - ADJUVANTED: ICD-10-PCS | Mod: S$GLB,,, | Performed by: INTERNAL MEDICINE

## 2021-11-08 RX ORDER — NITROGLYCERIN 0.4 MG/1
0.4 TABLET SUBLINGUAL EVERY 5 MIN PRN
Qty: 100 TABLET | Refills: 0 | Status: SHIPPED | OUTPATIENT
Start: 2021-11-08

## 2021-12-29 ENCOUNTER — TELEPHONE (OUTPATIENT)
Dept: PRIMARY CARE CLINIC | Facility: CLINIC | Age: 81
End: 2021-12-29
Payer: COMMERCIAL

## 2021-12-30 ENCOUNTER — TELEPHONE (OUTPATIENT)
Dept: PRIMARY CARE CLINIC | Facility: CLINIC | Age: 81
End: 2021-12-30
Payer: COMMERCIAL

## 2022-01-04 ENCOUNTER — TELEPHONE (OUTPATIENT)
Dept: PRIMARY CARE CLINIC | Facility: CLINIC | Age: 82
End: 2022-01-04
Payer: MEDICARE

## 2022-01-04 NOTE — TELEPHONE ENCOUNTER
It does not seem patient is on home health.  Which home health is following him and who referred him to that home health

## 2022-05-10 ENCOUNTER — OFFICE VISIT (OUTPATIENT)
Dept: PRIMARY CARE CLINIC | Facility: CLINIC | Age: 82
End: 2022-05-10
Payer: MEDICARE

## 2022-05-10 VITALS
HEART RATE: 66 BPM | TEMPERATURE: 97 F | WEIGHT: 167 LBS | OXYGEN SATURATION: 96 % | SYSTOLIC BLOOD PRESSURE: 102 MMHG | RESPIRATION RATE: 16 BRPM | BODY MASS INDEX: 24.73 KG/M2 | DIASTOLIC BLOOD PRESSURE: 59 MMHG | HEIGHT: 69 IN

## 2022-05-10 DIAGNOSIS — I48.0 PAROXYSMAL ATRIAL FIBRILLATION: ICD-10-CM

## 2022-05-10 DIAGNOSIS — R73.01 IMPAIRED FASTING GLUCOSE: Primary | ICD-10-CM

## 2022-05-10 DIAGNOSIS — F32.5 MAJOR DEPRESSIVE DISORDER WITH SINGLE EPISODE, IN FULL REMISSION: ICD-10-CM

## 2022-05-10 PROCEDURE — 3078F DIAST BP <80 MM HG: CPT | Mod: CPTII,S$GLB,, | Performed by: INTERNAL MEDICINE

## 2022-05-10 PROCEDURE — 3074F SYST BP LT 130 MM HG: CPT | Mod: CPTII,S$GLB,, | Performed by: INTERNAL MEDICINE

## 2022-05-10 PROCEDURE — 1160F RVW MEDS BY RX/DR IN RCRD: CPT | Mod: CPTII,S$GLB,, | Performed by: INTERNAL MEDICINE

## 2022-05-10 PROCEDURE — 99213 OFFICE O/P EST LOW 20 MIN: CPT | Mod: S$GLB,,, | Performed by: INTERNAL MEDICINE

## 2022-05-10 PROCEDURE — 1160F PR REVIEW ALL MEDS BY PRESCRIBER/CLIN PHARMACIST DOCUMENTED: ICD-10-PCS | Mod: CPTII,S$GLB,, | Performed by: INTERNAL MEDICINE

## 2022-05-10 PROCEDURE — 3074F PR MOST RECENT SYSTOLIC BLOOD PRESSURE < 130 MM HG: ICD-10-PCS | Mod: CPTII,S$GLB,, | Performed by: INTERNAL MEDICINE

## 2022-05-10 PROCEDURE — 1159F PR MEDICATION LIST DOCUMENTED IN MEDICAL RECORD: ICD-10-PCS | Mod: CPTII,S$GLB,, | Performed by: INTERNAL MEDICINE

## 2022-05-10 PROCEDURE — 99213 PR OFFICE/OUTPT VISIT, EST, LEVL III, 20-29 MIN: ICD-10-PCS | Mod: S$GLB,,, | Performed by: INTERNAL MEDICINE

## 2022-05-10 PROCEDURE — 1159F MED LIST DOCD IN RCRD: CPT | Mod: CPTII,S$GLB,, | Performed by: INTERNAL MEDICINE

## 2022-05-10 PROCEDURE — 3078F PR MOST RECENT DIASTOLIC BLOOD PRESSURE < 80 MM HG: ICD-10-PCS | Mod: CPTII,S$GLB,, | Performed by: INTERNAL MEDICINE

## 2022-05-10 NOTE — PROGRESS NOTES
Subjective:      Patient ID: Navi Almonte is a 81 y.o. male.    Chief Complaint: Follow-up (6month f/u )    Patient with h/o CAD s/p stenting in 1970s + CABG in 2018. Patient Patient also has CHF with EF 30%. reports NO ankle swelling, no PND and orthopnea.  Patient is being followed by Dr. Samano.  Patient is able to walk  a block only without getting short of breath. Patient has h/o Atrial fibrillation and is on Eliquis for anticiagulation.    Patient reports dizziness is improved.     Patient reported depression after Patient's lost his only son who recently committed suicide. Patient reports no more crying spells, no lack of energy/interest, no feeling of guilt and worthlessness. NO suicidal or homicidal ideation.    Patient symptoms are there though slightly improved than before.  Patient was prescribed Abilify but never took the medication. Off of all meds      Patient recent labs suggested impaired fasting glucose with A1c of 6.1.  Will not be very aggressive with blood sugar control    Review of Systems   Constitutional: Negative for chills, diaphoresis, fever, malaise/fatigue and weight loss.   HENT: Positive for congestion. Negative for ear pain, sinus pain, sore throat and tinnitus.    Eyes: Negative for blurred vision and photophobia.   Respiratory: Negative for cough, hemoptysis, shortness of breath and wheezing.    Cardiovascular: Negative for chest pain, palpitations, orthopnea, leg swelling and PND.   Gastrointestinal: Negative for abdominal pain, blood in stool, constipation, diarrhea, heartburn, melena, nausea and vomiting.   Genitourinary: Negative for dysuria, frequency and urgency.   Musculoskeletal: Negative for back pain, myalgias and neck pain.   Skin: Negative for rash.   Neurological: Negative for dizziness, tremors, seizures, loss of consciousness and weakness.   Endo/Heme/Allergies: Negative for polydipsia.   Psychiatric/Behavioral: Negative for depression and hallucinations. The  patient does not have insomnia.      Objective:     Physical Exam  Constitutional:       General: He is not in acute distress.     Appearance: He is not diaphoretic.   Neck:      Thyroid: No thyromegaly.   Cardiovascular:      Rate and Rhythm: Normal rate and regular rhythm.      Heart sounds: Normal heart sounds. No murmur heard.  Pulmonary:      Effort: Pulmonary effort is normal. No respiratory distress.      Breath sounds: Normal breath sounds. No wheezing.   Abdominal:      General: Bowel sounds are normal. There is no distension.      Palpations: Abdomen is soft.      Tenderness: There is no abdominal tenderness.   Lymphadenopathy:      Cervical: No cervical adenopathy.   Neurological:      Mental Status: He is alert and oriented to person, place, and time.   Psychiatric:         Behavior: Behavior normal.         Thought Content: Thought content normal.         Judgment: Judgment normal.       Assessment:     1. Impaired fasting glucose    2. Major depressive disorder with single episode, in full remission    3. Paroxysmal atrial fibrillation       Plan:   Patient CHF symptoms are under good control.   Patient is off of all blood pressure medication and blood pressure seem under better control  Will continue to monitor  Patient last A1c was 6.1.  Will repeat A1c  Patient reports depression is controlled without medication.  Repeat labs are ordered but not yet done.  Advised patient to get labs done

## 2022-05-11 ENCOUNTER — TELEPHONE (OUTPATIENT)
Dept: FAMILY MEDICINE | Facility: CLINIC | Age: 82
End: 2022-05-11
Payer: MEDICARE

## 2022-05-11 NOTE — TELEPHONE ENCOUNTER
----- Message from Saroj Bennett sent at 5/11/2022  9:17 AM CDT -----  Contact: self  Patient wife calling screaming on the phone wanting to speak to Theodorey regarding patient. She can be reached at [phone]

## 2022-05-19 ENCOUNTER — TELEPHONE (OUTPATIENT)
Dept: PRIMARY CARE CLINIC | Facility: CLINIC | Age: 82
End: 2022-05-19
Payer: MEDICARE

## 2022-05-19 NOTE — TELEPHONE ENCOUNTER
----- Message from Sandy Centeno LPN sent at 5/17/2022  4:58 PM CDT -----  Contact: self    ----- Message -----  From: Saroj Bennett  Sent: 5/17/2022   1:41 PM CDT  To: [facility] Staff    Patient wife called regarding someone going to they home for her . Patient wife wanted to know if Dr Jean assigned some one to go there. Please call [phone]

## 2022-05-20 ENCOUNTER — IMMUNIZATION (OUTPATIENT)
Dept: HEMATOLOGY/ONCOLOGY | Facility: CLINIC | Age: 82
End: 2022-05-20
Payer: MEDICARE

## 2022-05-20 DIAGNOSIS — Z23 NEED FOR VACCINATION: Primary | ICD-10-CM

## 2022-05-20 PROCEDURE — 0064A COVID-19, MRNA, LNP-S, PF, 100 MCG/0.25 ML DOSE VACCINE (MODERNA BOOSTER): CPT | Mod: S$GLB,,, | Performed by: FAMILY MEDICINE

## 2022-05-20 PROCEDURE — 91306 COVID-19, MRNA, LNP-S, PF, 100 MCG/0.25 ML DOSE VACCINE (MODERNA BOOSTER): CPT | Mod: S$GLB,,, | Performed by: FAMILY MEDICINE

## 2022-05-20 PROCEDURE — 0064A COVID-19, MRNA, LNP-S, PF, 100 MCG/0.25 ML DOSE VACCINE (MODERNA BOOSTER): ICD-10-PCS | Mod: S$GLB,,, | Performed by: FAMILY MEDICINE

## 2022-05-20 PROCEDURE — 91306 COVID-19, MRNA, LNP-S, PF, 100 MCG/0.25 ML DOSE VACCINE (MODERNA BOOSTER): ICD-10-PCS | Mod: S$GLB,,, | Performed by: FAMILY MEDICINE

## 2022-05-23 ENCOUNTER — TELEPHONE (OUTPATIENT)
Dept: PRIMARY CARE CLINIC | Facility: CLINIC | Age: 82
End: 2022-05-23
Payer: MEDICARE

## 2022-05-23 NOTE — TELEPHONE ENCOUNTER
Returning call to patient and also discuss labs results, labs look good, BNP slightly elevated, does patient have Cardiologist? LVM.

## 2023-08-02 ENCOUNTER — OFFICE VISIT (OUTPATIENT)
Dept: PRIMARY CARE CLINIC | Facility: CLINIC | Age: 83
End: 2023-08-02
Payer: MEDICARE

## 2023-08-02 VITALS
HEART RATE: 63 BPM | WEIGHT: 166.81 LBS | SYSTOLIC BLOOD PRESSURE: 110 MMHG | RESPIRATION RATE: 16 BRPM | BODY MASS INDEX: 24.71 KG/M2 | DIASTOLIC BLOOD PRESSURE: 66 MMHG | TEMPERATURE: 98 F | OXYGEN SATURATION: 96 % | HEIGHT: 69 IN

## 2023-08-02 DIAGNOSIS — I48.0 PAROXYSMAL ATRIAL FIBRILLATION: ICD-10-CM

## 2023-08-02 DIAGNOSIS — I50.9 CONGESTIVE HEART FAILURE, UNSPECIFIED HF CHRONICITY, UNSPECIFIED HEART FAILURE TYPE: ICD-10-CM

## 2023-08-02 DIAGNOSIS — F33.1 MODERATE EPISODE OF RECURRENT MAJOR DEPRESSIVE DISORDER: ICD-10-CM

## 2023-08-02 DIAGNOSIS — R09.81 NASAL CONGESTION: ICD-10-CM

## 2023-08-02 DIAGNOSIS — I25.10 ATHEROSCLEROSIS OF CORONARY ARTERY, UNSPECIFIED VESSEL OR LESION TYPE, UNSPECIFIED WHETHER ANGINA PRESENT, UNSPECIFIED WHETHER NATIVE OR TRANSPLANTED HEART: ICD-10-CM

## 2023-08-02 DIAGNOSIS — F32.5 MAJOR DEPRESSIVE DISORDER WITH SINGLE EPISODE, IN FULL REMISSION: Primary | ICD-10-CM

## 2023-08-02 PROCEDURE — 99215 PR OFFICE/OUTPT VISIT, EST, LEVL V, 40-54 MIN: ICD-10-PCS | Mod: S$GLB,,, | Performed by: INTERNAL MEDICINE

## 2023-08-02 PROCEDURE — 1160F PR REVIEW ALL MEDS BY PRESCRIBER/CLIN PHARMACIST DOCUMENTED: ICD-10-PCS | Mod: CPTII,S$GLB,, | Performed by: INTERNAL MEDICINE

## 2023-08-02 PROCEDURE — 3074F PR MOST RECENT SYSTOLIC BLOOD PRESSURE < 130 MM HG: ICD-10-PCS | Mod: CPTII,S$GLB,, | Performed by: INTERNAL MEDICINE

## 2023-08-02 PROCEDURE — 1159F PR MEDICATION LIST DOCUMENTED IN MEDICAL RECORD: ICD-10-PCS | Mod: CPTII,S$GLB,, | Performed by: INTERNAL MEDICINE

## 2023-08-02 PROCEDURE — 3074F SYST BP LT 130 MM HG: CPT | Mod: CPTII,S$GLB,, | Performed by: INTERNAL MEDICINE

## 2023-08-02 PROCEDURE — 3078F DIAST BP <80 MM HG: CPT | Mod: CPTII,S$GLB,, | Performed by: INTERNAL MEDICINE

## 2023-08-02 PROCEDURE — 3078F PR MOST RECENT DIASTOLIC BLOOD PRESSURE < 80 MM HG: ICD-10-PCS | Mod: CPTII,S$GLB,, | Performed by: INTERNAL MEDICINE

## 2023-08-02 PROCEDURE — 1160F RVW MEDS BY RX/DR IN RCRD: CPT | Mod: CPTII,S$GLB,, | Performed by: INTERNAL MEDICINE

## 2023-08-02 PROCEDURE — 99215 OFFICE O/P EST HI 40 MIN: CPT | Mod: S$GLB,,, | Performed by: INTERNAL MEDICINE

## 2023-08-02 PROCEDURE — 1159F MED LIST DOCD IN RCRD: CPT | Mod: CPTII,S$GLB,, | Performed by: INTERNAL MEDICINE

## 2023-08-02 RX ORDER — FLUTICASONE PROPIONATE 50 MCG
1 SPRAY, SUSPENSION (ML) NASAL DAILY
Qty: 16 G | Refills: 2 | Status: SHIPPED | OUTPATIENT
Start: 2023-08-02

## 2023-08-02 NOTE — PROGRESS NOTES
"  Subjective:      Patient ID: Navi Almonte is a 82 y.o. male.    Chief Complaint: Follow-up    HPI:  Patient with coronary artery disease s/p stenting in 1970 + CABG in 2018.  Patient also has congestive heart failure with EF 30% only.  Patient reports no ankle swelling, no PND, orthopnea.  Patient is being followed by Dr. Samano.  Patient also has atrial fibrillation and is on Eliquis.  Patient denies any dizziness lightheadedness.    Patient reports depression and has crying spells, lack of energy/interest, feeling of guilt and worthlessness. Denies any suicidal or homicidal ideations.  Patient and his wife live alone.  Patient only son committed suicide few years ago.  Patient wife is not doing very well and he is sole care provider for his wife    Review of Systems   Constitutional:  Negative for chills, diaphoresis, fever, malaise/fatigue and weight loss.   HENT:  Negative for congestion, ear pain, sinus pain, sore throat and tinnitus.    Eyes:  Negative for blurred vision and photophobia.   Respiratory:  Negative for cough, hemoptysis, shortness of breath and wheezing.    Cardiovascular:  Negative for chest pain, palpitations, orthopnea, leg swelling and PND.   Gastrointestinal:  Negative for abdominal pain, blood in stool, constipation, diarrhea, heartburn, melena, nausea and vomiting.   Genitourinary:  Negative for dysuria, frequency and urgency.   Musculoskeletal:  Negative for back pain, myalgias and neck pain.   Skin:  Negative for rash.   Neurological:  Negative for dizziness, tremors, seizures, loss of consciousness and weakness.   Endo/Heme/Allergies:  Negative for polydipsia.   Psychiatric/Behavioral:  Positive for depression. Negative for hallucinations. The patient does not have insomnia.      Objective:   BP (!) 97/57 (BP Location: Left arm, Patient Position: Sitting, BP Method: Medium (Automatic))   Pulse 67   Temp 97.7 °F (36.5 °C) (Temporal)   Resp 16   Ht 5' 9" (1.753 m)   Wt 75.7 kg " (166 lb 12.8 oz)   SpO2 96%   BMI 24.63 kg/m²   Physical Exam  Constitutional:       General: He is not in acute distress.     Appearance: He is not diaphoretic.   Neck:      Thyroid: No thyromegaly.   Cardiovascular:      Rate and Rhythm: Normal rate and regular rhythm.      Heart sounds: Normal heart sounds. No murmur heard.  Pulmonary:      Effort: Pulmonary effort is normal. No respiratory distress.      Breath sounds: Normal breath sounds. No wheezing.   Abdominal:      General: Bowel sounds are normal. There is no distension.      Palpations: Abdomen is soft.      Tenderness: There is no abdominal tenderness.   Lymphadenopathy:      Cervical: No cervical adenopathy.   Neurological:      Mental Status: He is alert.      Comments: Patient is oriented to time place and person but is slow in responding and answering the questions   Psychiatric:         Behavior: Behavior normal.         Thought Content: Thought content normal.         Judgment: Judgment normal.       Assessment:       ICD-10-CM ICD-9-CM   1. Major depressive disorder with single episode, in full remission  F32.5 296.26   2. Congestive heart failure, unspecified HF chronicity, unspecified heart failure type  I50.9 428.0   3. Paroxysmal atrial fibrillation  I48.0 427.31   4. Nasal congestion  R09.81 478.19       Plan:     Patient is being evaluated in my clinic after a year.   Patient denies any congestive heart failure symptoms and blood pressure seem under good control  Will repeat labs  Patient though oriented to time place and person is slow in responding  I really do not think that patient is able to take care of himself + his wife  Home health is visiting patient wife and according to them houses very cluster and very hot  Home health is not sure if air conditioning is working.  Neither patient nor his wife is able to prepare meals and are taking already prepared meals.  Talked to patient and his wife in detail about need to finding  accommodation assisted living or nursing home  Patient is willing to consider these options and has been looking around  [facility] Services were requested to go to patient house for evaluation of living conditions for patient wife  Patient previously denied need of medication for his depression.  But today he seem more conducive to start the medication  Will try Lexapro  I talked to cardiology nurse about the patient as well and she reported patient is still on Eliquis and is being followed by Dr. Samano  Will try to reach out to  again to assist patient and his wife in assisted living/nursing home placement      Medication List with Changes/Refills   New Medications    FLUTICASONE PROPIONATE (FLONASE) 50 MCG/ACTUATION NASAL SPRAY    1 spray (50 mcg total) by Each Nostril route once daily.   Current Medications    ATORVASTATIN (LIPITOR) 40 MG TABLET    atorvastatin 40 mg tablet    BLOOD SUGAR DIAGNOSTIC STRP    1 each by Misc.(Non-Drug; Combo Route) route once daily.    BLOOD-GLUCOSE METER KIT    Use as instructed    ELIQUIS 5 MG TAB    Take 5 mg by mouth 2 (two) times daily.    LANCETS MISC    1 each by Misc.(Non-Drug; Combo Route) route once daily.    METOPROLOL TARTRATE (LOPRESSOR) 12.5 MG TABLET    Take 25 mg by mouth once daily.    NITROGLYCERIN (NITROSTAT) 0.4 MG SL TABLET    Place 1 tablet (0.4 mg total) under the tongue every 5 (five) minutes as needed for Chest pain.

## 2023-08-04 RX ORDER — ESCITALOPRAM OXALATE 10 MG/1
10 TABLET ORAL DAILY
Qty: 30 TABLET | Refills: 11 | Status: SHIPPED | OUTPATIENT
Start: 2023-08-04 | End: 2024-08-03

## 2023-08-28 ENCOUNTER — TELEPHONE (OUTPATIENT)
Dept: PRIMARY CARE CLINIC | Facility: CLINIC | Age: 83
End: 2023-08-28
Payer: MEDICARE

## 2023-08-28 DIAGNOSIS — Z02.2 ENCOUNTER FOR EXAMINATION FOR ADMISSION TO NURSING HOME: Primary | ICD-10-CM

## 2023-08-28 NOTE — TELEPHONE ENCOUNTER
----- Message from Julio Dempsey sent at 8/28/2023  1:20 PM CDT -----  Contact: Adrianne Silver() is calling to speak with nurse regarding [place] Post and TB test results for patient. Reports those 2 pages were missing from paperwork that was faxed over and needing paperwork with the next hour due to patient and wife moving into assistant living home. I called the office 3 times and no answer. Please give Adrianne a call at [phone]

## 2023-08-28 NOTE — TELEPHONE ENCOUNTER
Returned call to Adrianne williamson/ assisted living home and she states she is missing a tb test or chest xray and also needs a [place] POST on pt.

## 2023-09-08 ENCOUNTER — PATIENT OUTREACH (OUTPATIENT)
Dept: ADMINISTRATIVE | Facility: OTHER | Age: 83
End: 2023-09-08
Payer: MEDICARE

## 2023-09-08 NOTE — PROGRESS NOTES
"CHW - Outreach Attempt    Community Health Worker left a voicemail message for 1st attempt to contact patient regarding: CM referral stating: "Elderly couple who is living alone and according to home health they houses cluttered and is very hard not sure if the air conditioner is working.  Both  & wife are not able to take care of themselves living or nursing home placement.  Please help"      Community Health Worker to attempt to contact patient on: 9/8/23    "

## 2023-09-12 NOTE — PROGRESS NOTES
CHW - Outreach Attempt    Community Health Worker left a voicemail message for 2nd attempt to contact patient regarding: cm referral for possible nursing home placement due to HH reports of home clutter and possibly no A/C     Community Health Worker to attempt to contact patient on: 9/12/23

## 2023-09-15 NOTE — PROGRESS NOTES
"CHW - Outreach Attempt    Community Health Worker left a voicemail message for 3rd attempt to contact patient regarding:  CM referral stating: "Elderly couple who is living alone and according to home health they houses cluttered and is very hard not sure if the air conditioner is working.  Both  & wife are not able to take care of themselves living or nursing home placement. "    Community Health Worker to attempt to contact patient on: 9/15/23    "